# Patient Record
Sex: MALE | Race: WHITE
[De-identification: names, ages, dates, MRNs, and addresses within clinical notes are randomized per-mention and may not be internally consistent; named-entity substitution may affect disease eponyms.]

---

## 2020-06-18 ENCOUNTER — HOSPITAL ENCOUNTER (OUTPATIENT)
Dept: HOSPITAL 84 - D.OPS | Age: 30
Discharge: HOME | End: 2020-06-18
Attending: ORTHOPAEDIC SURGERY
Payer: COMMERCIAL

## 2020-06-18 VITALS — HEIGHT: 67 IN | BODY MASS INDEX: 22.76 KG/M2 | WEIGHT: 145 LBS | BODY MASS INDEX: 22.76 KG/M2

## 2020-06-18 VITALS — SYSTOLIC BLOOD PRESSURE: 103 MMHG | DIASTOLIC BLOOD PRESSURE: 66 MMHG

## 2020-06-18 DIAGNOSIS — S52.202K: Primary | ICD-10-CM

## 2020-06-23 NOTE — OP
PATIENT NAME:  LEN TAVARES                                MEDICAL RECORD: [id number]
:88                                             LOCATION:D.OPS          
                                                         ADMISSION DATE:        
SURGEON:  ELVA CLIFTON MD        
 
 
DATE OF OPERATION:  2020
 
PREOPERATIVE DIAGNOSIS:  Chronic nonunion of the left ulna.
 
POSTOPERATIVE DIAGNOSIS:  Chronic nonunion of the left ulna.
 
PROCEDURES:
1.  Open reduction internal fixation of chronic ulnar nonunion.
2.  Allograft bone grafting.
 
SURGEON:  Elva Clifton MD
 
ASSISTANT:  CONSTANCE Jones
 
INTRAOPERATIVE COMPLICATIONS:  None.
 
SUMMARY OF PATHOLOGIC FINDINGS:  The patient had a complete nonunion with
fibrous interposition to across the entire fracture.  This required complete
takedown of compressive plating with 5 cc of BIO4 bone graft interposed.
 
OPERATIVE SUMMARY IN DETAIL:  After obtaining the appropriate preoperative
orthopedic surgery consent as well as anesthetic consultation, evaluation and
clearance, the patient was brought to the operating room and placed on table in
supine position.  After general laryngeal mask airway was administered,
tourniquet was placed on the proximal aspect of the left upper extremity.  Left
upper extremity was then prepped and draped in routine sterile fashion.  The arm
was elevated and exsanguinated, tourniquet was inflated to 250 mmHg. An incision
was made directly over the ulna, taken down until the level of the fracture was
identified.  Serial and sequential curettage as well as rongeur was utilized to
completely devoid the fibrous nonunion of all fibrous elements.  Curettage was
then utilized to refresh in the bone ends.  At this point, the thawed BIO4 was
then placed in the fracture itself and then a 5-hole compression plate VariAx 2
from [employer] [place] was placed under compressive tension under fluoroscopic guidance. 
Having completed this, wound was closed with #1 Vicryl, 2-0 Vicryl and skin
ayo by CONSTANCE Jones.  Sterile dressings were applied.  A sugar-tong
splint was applied.  The patient was then awakened and taken to the recovery
room in stable condition.  All final needle and sponge counts were correct.
 
TRANSINT:[id number] Voice Confirmation ID: [id number] DOCUMENT ID: [id number]
                                           
                                           ELVA CLIFTON MD        
 
 
 
Electronically Signed by ELVA CLIFTON MD on 20 at 1456
CC:                                                             [id number]
DICTATION DATE: 20 0934     :     20 1413      [facility] 
                                                                      20
[facility]                                          
[address] [facility] [place] [address]